# Patient Record
Sex: FEMALE | Race: WHITE
[De-identification: names, ages, dates, MRNs, and addresses within clinical notes are randomized per-mention and may not be internally consistent; named-entity substitution may affect disease eponyms.]

---

## 2018-04-18 ENCOUNTER — HOSPITAL ENCOUNTER (OUTPATIENT)
Dept: HOSPITAL 17 - HPND | Age: 38
End: 2018-04-18
Attending: OBSTETRICS & GYNECOLOGY
Payer: MEDICAID

## 2018-04-18 DIAGNOSIS — O24.112: ICD-10-CM

## 2018-04-18 DIAGNOSIS — O09.522: Primary | ICD-10-CM

## 2018-04-18 DIAGNOSIS — O99.322: ICD-10-CM

## 2018-04-18 PROCEDURE — 76811 OB US DETAILED SNGL FETUS: CPT

## 2018-04-25 ENCOUNTER — HOSPITAL ENCOUNTER (EMERGENCY)
Dept: HOSPITAL 17 - NEPD | Age: 38
Discharge: HOME | End: 2018-04-25
Payer: MEDICAID

## 2018-04-25 VITALS
DIASTOLIC BLOOD PRESSURE: 59 MMHG | OXYGEN SATURATION: 99 % | SYSTOLIC BLOOD PRESSURE: 105 MMHG | TEMPERATURE: 98.2 F | HEART RATE: 79 BPM | RESPIRATION RATE: 20 BRPM

## 2018-04-25 DIAGNOSIS — Z3A.20: ICD-10-CM

## 2018-04-25 DIAGNOSIS — O24.912: ICD-10-CM

## 2018-04-25 DIAGNOSIS — Z87.39: ICD-10-CM

## 2018-04-25 DIAGNOSIS — Z87.42: ICD-10-CM

## 2018-04-25 DIAGNOSIS — Z86.59: ICD-10-CM

## 2018-04-25 DIAGNOSIS — O26.892: Primary | ICD-10-CM

## 2018-04-25 DIAGNOSIS — J20.9: ICD-10-CM

## 2018-04-25 DIAGNOSIS — B97.89: ICD-10-CM

## 2018-04-25 DIAGNOSIS — O99.332: ICD-10-CM

## 2018-04-25 PROCEDURE — 99283 EMERGENCY DEPT VISIT LOW MDM: CPT

## 2018-04-25 NOTE — PD
HPI


Chief Complaint:  Cold / Flu Symptoms


Time Seen by Provider:  17:18


Travel History


International Travel<30 days:  No


Contact w/Intl Traveler<30days:  No


Traveled to known affect area:  No





History of Present Illness


HPI


The patient was seen and examined in the presence of the nurse.  This patient 

complains of cough.  Duration is 4 days.  Severity is moderate.  No fever or 

chest pain or shortness of breath.  She is a cigarette smoker.  She is 20 weeks 

pregnant





PFSH


Past Medical History


Anxiety:  Yes


Depression:  Yes


Diabetes:  Yes (DIET CONTROL)


Diminished Hearing:  No


Genitourinary:  Yes (INCONTINENCE)


Musculoskeletal:  Yes (DEGENERATIVE DISK DISEASE)


Reproductive:  Yes (RT OVARIAN CYST, ENDOMETRIOSIS)


:  5


Para:  0


Miscarriage:  5


:  0





Past Surgical History


Neurologic Surgery:  Yes (MEDIAL NERVE RT ARM)





Social History


Alcohol Use:  Yes (ONCE A WEEK )


Tobacco Use:  Yes (1/2 PPD)


Substance Use:  No





Allergies-Medications


(Allergen,Severity, Reaction):  


Coded Allergies:  


     bee venom protein (honey bee) (Unverified  Allergy, Severe, ANAPHYLACTIC, 

17)


     amitriptyline (Unverified  Adverse Reaction, Severe, "DEADLY", 17)


     gabapentin (Unverified  Adverse Reaction, Severe, "DEADLY", 17)


     paroxetine (Unverified  Adverse Reaction, Severe, HALLLUCINATIONS, 17

)


     sertraline (Unverified  Adverse Reaction, Severe, HALLLUCINATIONS, 17

)


Reported Meds & Prescriptions





Reported Meds & Active Scripts


Active


Ventolin Hfa 18 GM Inh (Albuterol Sulfate) 90 Mcg/Act Aer 2 Puff INH Q6H PRN


Proctosol Hc 2.5% (Hydrocortisone Rectal 2.5%) 2.5% Cream 1 Applic RECTAL Q4H 

PRN


Ondansetron HCl 4 Mg Tab 4 Bottle PO Q 4H


Reported


[subutex]   8 PO BID








Review of Systems


General / Constitutional:  No: Fever


Cardiovascular:  No: Chest Pain or Discomfort


Respiratory:  Positive: Cough


Gastrointestinal:  No: Vomiting


Genitourinary:  No: Frequency





Physical Exam


Narrative


GENERAL: Well-nourished, well-developed patient.


SKIN: Focused skin assessment warm/dry.


HEAD: Normocephalic.


EYES: No scleral icterus. No injection or drainage. 


NECK: Supple, trachea midline. No JVD or lymphadenopathy.


CARDIOVASCULAR: Regular rate and rhythm without murmurs, gallops, or rubs. 


RESPIRATORY: Breath sounds equal bilaterally. No accessory muscle use.


GASTROINTESTINAL: Abdomen soft, non-tender, nondistended.  Gravid uterus is 

nontender


MUSCULOSKELETAL: No cyanosis, or edema. 


BACK: Nontender without obvious deformity. No CVA tenderness.





Data


Data


Last Documented VS





Vital Signs








  Date Time  Temp Pulse Resp B/P (MAP) Pulse Ox O2 Delivery O2 Flow Rate FiO2


 


18 16:23 98.2 79 20 105/59 (74) 99   











MDM


Medical Decision Making


Medical Screen Exam Complete:  Yes


Emergency Medical Condition:  Yes


Medical Record Reviewed:  Yes


Differential Diagnosis


Bronchitis, URI, pneumonia


Narrative Course


I have reviewed the patient's electronic medical record.





Patient is clear lungs with good saturations.  Presentation consistent with 

acute viral bronchitis


I see no indication for antibiotics





Prescribed an albuterol inhaler to use as needed


Side effects are discussed


She should stop smoking





Diagnosis





 Primary Impression:  


 Acute viral bronchitis


 Additional Impression:  


 Pregnant and not yet delivered in second trimester





***Additional Instructions:  


The patient was advised to follow up with their physician and return if they 

worsen.


Stop smoking


Give a trial of albuterol inhaler as needed


***Med/Other Pt SpecificInfo:  Prescription(s) given


Scripts


Albuterol 18 GM Inh (Ventolin Hfa 18 GM Inh) 90 Mcg/Act Aer


2 PUFF INH Q6H Y for SHORTNESS OF BREATH, #1 INHALER 0 Refills


   Prov: Javi Kent MD         18


Disposition:  01 DISCHARGE HOME


Condition:  Stable











Javi Kent MD 2018 17:55